# Patient Record
Sex: FEMALE | Race: BLACK OR AFRICAN AMERICAN | NOT HISPANIC OR LATINO | URBAN - METROPOLITAN AREA
[De-identification: names, ages, dates, MRNs, and addresses within clinical notes are randomized per-mention and may not be internally consistent; named-entity substitution may affect disease eponyms.]

---

## 2020-12-18 ENCOUNTER — OUTPATIENT (OUTPATIENT)
Dept: OUTPATIENT SERVICES | Facility: HOSPITAL | Age: 50
LOS: 1 days | End: 2020-12-18
Payer: MEDICAID

## 2020-12-18 DIAGNOSIS — Z20.828 CONTACT WITH AND (SUSPECTED) EXPOSURE TO OTHER VIRAL COMMUNICABLE DISEASES: ICD-10-CM

## 2020-12-18 LAB — SARS-COV-2 RNA SPEC QL NAA+PROBE: SIGNIFICANT CHANGE UP

## 2020-12-18 PROCEDURE — U0003: CPT

## 2020-12-19 DIAGNOSIS — Z20.828 CONTACT WITH AND (SUSPECTED) EXPOSURE TO OTHER VIRAL COMMUNICABLE DISEASES: ICD-10-CM

## 2022-06-29 ENCOUNTER — EMERGENCY (EMERGENCY)
Facility: HOSPITAL | Age: 52
LOS: 0 days | Discharge: HOME | End: 2022-06-30
Attending: EMERGENCY MEDICINE

## 2022-06-29 VITALS
SYSTOLIC BLOOD PRESSURE: 160 MMHG | OXYGEN SATURATION: 98 % | HEIGHT: 63 IN | DIASTOLIC BLOOD PRESSURE: 108 MMHG | TEMPERATURE: 98 F | RESPIRATION RATE: 16 BRPM | WEIGHT: 175.05 LBS | HEART RATE: 83 BPM
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
## 2022-06-29 DIAGNOSIS — R51.9 HEADACHE, UNSPECIFIED: ICD-10-CM
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
## 2022-06-29 DIAGNOSIS — Z91.013 ALLERGY TO SEAFOOD: ICD-10-CM

## 2022-06-29 DIAGNOSIS — I10 ESSENTIAL (PRIMARY) HYPERTENSION: ICD-10-CM

## 2022-06-29 PROCEDURE — 99283 EMERGENCY DEPT VISIT LOW MDM: CPT

## 2022-06-30 VITALS
DIASTOLIC BLOOD PRESSURE: 89 MMHG | HEART RATE: 74 BPM | OXYGEN SATURATION: 100 % | RESPIRATION RATE: 16 BRPM | SYSTOLIC BLOOD PRESSURE: 123 MMHG

## 2022-06-30 RX ORDER — TRAMADOL HYDROCHLORIDE 50 MG/1
1 TABLET ORAL
Qty: 15 | Refills: 0
Start: 2022-06-30 | End: 2022-07-04

## 2022-06-30 RX ORDER — DIAZEPAM 5 MG
1 TABLET ORAL
Qty: 12 | Refills: 0
Start: 2022-06-30 | End: 2022-07-03

## 2022-06-30 RX ORDER — IBUPROFEN 200 MG
1 TABLET ORAL
Qty: 15 | Refills: 0
Start: 2022-06-30 | End: 2022-07-04

## 2022-06-30 RX ORDER — IBUPROFEN 200 MG
600 TABLET ORAL ONCE
Refills: 0 | Status: COMPLETED | OUTPATIENT
Start: 2022-06-30 | End: 2022-06-30

## 2022-06-30 RX ADMIN — Medication 600 MILLIGRAM(S): at 01:58

## 2022-06-30 NOTE — ED ADULT NURSE NOTE - NSIMPLEMENTINTERV_GEN_ALL_ED
Implemented All Universal Safety Interventions:  [place] to call system. Call bell, personal items and telephone within reach. Instruct patient to call for assistance. Room bathroom lighting operational. Non-slip footwear when patient is off stretcher. Physically safe environment: no spills, clutter or unnecessary equipment. Stretcher in lowest position, wheels locked, appropriate side rails in place.
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
## 2022-06-30 NOTE — ED PROVIDER NOTE - PATIENT PORTAL LINK FT
You can access the FollowMyHealth Patient Portal offered by [facility] by registering at the following website: [email or web address]. By joining [facility]’s FollowMyHealth portal, you will also be able to view your health information using other applications (apps) compatible with our system.
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
## 2022-06-30 NOTE — ED PROVIDER NOTE - NSFOLLOWUPINSTRUCTIONS_ED_ALL_ED_FT
Take the IBUPROFEN for moderate pain.    Add TRAMADOL if the pain becomes more severe.    Take VALIUM to help with muscle relaxation.    These medications can cause drowsiness, so do not take if needing to be alert.    A warm compress may help as well to control muscle tension.

## 2023-06-24 ENCOUNTER — OFFICE (OUTPATIENT)
Dept: URBAN - METROPOLITAN AREA CLINIC 90 | Facility: CLINIC | Age: 53
Setting detail: OPHTHALMOLOGY
End: 2023-06-24
Payer: MEDICAID

## 2023-06-24 ENCOUNTER — RX ONLY (RX ONLY)
Age: 53
End: 2023-06-24

## 2023-06-24 DIAGNOSIS — H25.13: ICD-10-CM

## 2023-06-24 PROBLEM — H11.153 PINGUECULA; BOTH EYES: Status: ACTIVE | Noted: 2023-06-24

## 2023-06-24 PROBLEM — H11.133 CONJUNCTIVAL PIGMENTATIONS; BOTH EYES: Status: ACTIVE | Noted: 2023-06-24

## 2023-06-24 PROBLEM — H52.7 REFRACTIVE ERROR: Status: ACTIVE | Noted: 2023-06-24

## 2023-06-24 PROBLEM — H35.40 PERIPHERAL RETINAL DEGENERATION UNSPECIFIED: Status: ACTIVE | Noted: 2023-06-24

## 2023-06-24 PROCEDURE — 92004 COMPRE OPH EXAM NEW PT 1/>: CPT | Performed by: OPHTHALMOLOGY

## 2023-06-24 ASSESSMENT — SPHEQUIV_DERIVED
OD_SPHEQUIV: -0.125
OS_SPHEQUIV: 0.125
OD_SPHEQUIV: -0.5

## 2023-06-24 ASSESSMENT — REFRACTION_MANIFEST
OD_VA2: 20/25(J1)
OD_CYLINDER: -0.50
OS_VA1: 20/20
OD_ADD: +2.00
OS_ADD: +2.00
OS_AXIS: 080
OD_SPHERE: -0.25
OS_SPHERE: PLANO
OD_VA1: 20/20
OS_CYLINDER: -0.75
OS_VA2: 20/25(J1)
OD_AXIS: 100

## 2023-06-24 ASSESSMENT — TONOMETRY
OS_IOP_MMHG: 15
OD_IOP_MMHG: 14

## 2023-06-24 ASSESSMENT — VISUAL ACUITY
OD_BCVA: 20/25+1
OS_BCVA: 20/25

## 2023-06-24 ASSESSMENT — AXIALLENGTH_DERIVED
OD_AL: 24.216
OD_AL: 24.0631
OS_AL: 23.8678

## 2023-06-24 ASSESSMENT — KERATOMETRY
OS_K2POWER_DIOPTERS: 43.00
OD_K2POWER_DIOPTERS: 42.75
OD_AXISANGLE_DEGREES: 080
METHOD_AUTO_MANUAL: AUTO
OD_K1POWER_DIOPTERS: 42.00
OS_K1POWER_DIOPTERS: 42.25
OS_AXISANGLE_DEGREES: 102

## 2023-06-24 ASSESSMENT — REFRACTION_AUTOREFRACTION
OD_AXIS: 093
OD_CYLINDER: -0.75
OS_AXIS: 076
OS_SPHERE: +0.50
OD_SPHERE: +0.25
OS_CYLINDER: -0.75

## 2023-06-24 ASSESSMENT — CONFRONTATIONAL VISUAL FIELD TEST (CVF)
OD_FINDINGS: FULL
OS_FINDINGS: FULL

## 2024-01-16 ENCOUNTER — APPOINTMENT (OUTPATIENT)
Dept: ORTHOPEDIC SURGERY | Facility: CLINIC | Age: 54
End: 2024-01-16

## 2024-01-18 PROBLEM — Z00.00 ENCOUNTER FOR PREVENTIVE HEALTH EXAMINATION: Status: ACTIVE | Noted: 2024-01-18

## 2024-01-25 ENCOUNTER — APPOINTMENT (OUTPATIENT)
Dept: ORTHOPEDIC SURGERY | Facility: CLINIC | Age: 54
End: 2024-01-25
Payer: MEDICAID

## 2024-01-25 VITALS — HEART RATE: 90 BPM | DIASTOLIC BLOOD PRESSURE: 107 MMHG | SYSTOLIC BLOOD PRESSURE: 168 MMHG

## 2024-01-25 DIAGNOSIS — M25.512 PAIN IN LEFT SHOULDER: ICD-10-CM

## 2024-01-25 DIAGNOSIS — G89.29 PAIN IN LEFT SHOULDER: ICD-10-CM

## 2024-01-25 PROCEDURE — 99203 OFFICE O/P NEW LOW 30 MIN: CPT

## 2024-01-25 PROCEDURE — 73030 X-RAY EXAM OF SHOULDER: CPT | Mod: LT

## 2024-01-26 PROBLEM — M25.512 CHRONIC LEFT SHOULDER PAIN: Status: ACTIVE | Noted: 2024-01-26
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
## 2024-01-26 NOTE — PHYSICAL EXAM
[de-identified] : Left shoulder exam:   Inspection: No malalignment, No defects, No atrophy Skin: No masses, No lesions Neck: Negative Spurling, full ROM, no pain with ROM AROM: FF to 180, abduction to 90, ER to 80, IR to upper lumbar Painful arc ROM: none Tenderness: No bicipital tenderness, no tenderness to greater tuberosity/RTC insertion, no anterior shoulder/lesser tuberosity tenderness Strength: 5/5 ER, 5/5 IR in adduction, 5/5 supraspinatus testing, negative Brighton's test AC joint: + TTP/pain with cross arm testing Biceps: Speed Negative, Yergason Negative Impingement test: Negative Sullivan, Negative Neer Vasc: 2+ radial pulse Stability: Stable Neuro: AIN, PIN, Ulnar nerve intact to motor Sensation: Intact to light touch throughout Other findings: None. [de-identified] : The following radiographs were ordered and read by me during this patients visit. I reviewed each radiograph in detail with the patient and discussed the findings as highlighted below.  3 views of left shoulder were obtained today, 01/25/2024, that show no acute fracture or dislocation. There is no glenohumeral and mild AC joint degenerative change seen. Type II acromion. There is no significant malalignment. No significant other obvious osseous abnormality, otherwise unremarkable.

## 2024-01-26 NOTE — DISCUSSION/SUMMARY
[de-identified] : 54 y/o female with left shoulder pain.   Clinically, patient has mild positive impingement signs. A discussion was had with the patient regarding potential sources of inflammatory shoulder discomfort; including rotator cuff tendon dysfunction (including tendonitis vs. internal structural damage), subdeltoid/subacromial bursitis, or impingement of the rotator cuff at the acromion. Other contributing sources of pain may be the acromioclavicular joint or long head of the biceps tendon. Irritation is typically caused either by overhead repetitive activity or as a result of minor injury.   Discussed short-term and long-term outcomes as well as the goal of treatment to reduce pain and restore function. Nonsurgical treatment is typically first-line therapy that may take weeks to months to resolve symptoms; includes rest from overhead activities, NSAIDs, home exercise program versus physical therapy to restore normal strength/ROM/function of the shoulder, and possible corticosteroid injection. Also discussed the role of arthroscopic surgical intervention when nonsurgical treatment does not adequately relieve pain/inflammation.   Patient also has overlying cervical radicular symptoms. Referral was given for [facility] for further evaluation.   Recommendations: Conservative modalities as above (overhead activity rest/activity avoidance until less symptomatic, ice, NSAIDs, home exercise strengthening and stretching program).   Followup: If symptoms progress or persist for additional treatment as needed

## 2024-01-26 NOTE — ADDENDUM
[FreeTextEntry1] : This note was written by Aminah Allen on 01/25/2024 acting solely as a scribe for Dr. Da Espinoza.  All medical record entries made by the Scribe were at my, Dr. Da Espinoza, direction and personally dictated by me on 01/25/2024. I have personally reviewed the chart and agree that the record accurately reflects my personal performance of the history, physical exam, assessment and plan.

## 2024-02-07 ENCOUNTER — APPOINTMENT (OUTPATIENT)
Dept: ORTHOPEDIC SURGERY | Facility: CLINIC | Age: 54
End: 2024-02-07
Payer: MEDICAID

## 2024-02-07 VITALS
DIASTOLIC BLOOD PRESSURE: 101 MMHG | HEART RATE: 74 BPM | WEIGHT: 189 LBS | HEIGHT: 63 IN | SYSTOLIC BLOOD PRESSURE: 146 MMHG | BODY MASS INDEX: 33.49 KG/M2

## 2024-02-07 DIAGNOSIS — M54.2 CERVICALGIA: ICD-10-CM

## 2024-02-07 PROCEDURE — 72040 X-RAY EXAM NECK SPINE 2-3 VW: CPT

## 2024-02-07 PROCEDURE — 99214 OFFICE O/P EST MOD 30 MIN: CPT

## 2024-02-07 NOTE — PHYSICAL EXAM
[de-identified] : Examination of the cervical spine reveals no midline or paraspinal tenderness to palpation. No cervical lymphadenopathy. Decreased range of motion with respect to flexion, extension, rotation, and lateral bending.  Positive Spurlings. Negative Lhermitte's. Full range of motion bilateral shoulders without evidence of impingement. No instability of bilateral upper extremities.  Cranial nerves II through XII grossly intact. Intact sensation bilateral upper extremities. 5/5 deltoids biceps triceps wrist extensors wrist flexors finger flexors and hand intrinsics. 1+ biceps triceps and brachioradialis reflexes. Negative Contreras's. 2+ radial pulse. Negative Tinel's over the cubital and carpal tunnel. No skin lesions on the right and left upper extremities. [de-identified] : AP lateral cervical x-rays with spondylosis without instability or aggressive lesions

## 2024-02-07 NOTE — DISCUSSION/SUMMARY
[de-identified] : Given her symptoms that failed to improve with physical therapy and persistent radiculopathy we will obtain a cervical MRI.  We briefly discussed the possibility of injections.  Follow-up afterwards.

## 2024-02-07 NOTE — HISTORY OF PRESENT ILLNESS
[de-identified] : Patient is a 53 year-old female who presents to the office today for initial evaluation of neck and upper back pain. She was recently seen by my colleague, Dr. Da Espinoza, who referred her to my practice for further evaluation of her neck. Pain is mostly on the left side of the neck and radiates into the superior aspect of the left shoulder. She also gets "numbness" that radiates from the lateral shoulder down to her left thumb. These symptoms are intermittent in nature. She has been taking Naproxen which does give relief.   Upon detailed questioning of the patient, they deny any complaints of fever, chills, sweats, nausea or vomiting, bowel or bladder dysfunction, recent weight loss or gain, or night pain. The above history is in addition to the intake form, completed by the patient, that I personally reviewed and discussed with her at length during this visit. There is no correlation identified between their presenting symptoms and their family, social and past medical history.

## 2024-02-09 ENCOUNTER — NON-APPOINTMENT (OUTPATIENT)
Age: 54
End: 2024-02-09

## 2024-03-29 ENCOUNTER — APPOINTMENT (OUTPATIENT)
Dept: ORTHOPEDIC SURGERY | Facility: CLINIC | Age: 54
End: 2024-03-29
Payer: MEDICAID

## 2024-03-29 DIAGNOSIS — M48.02 SPINAL STENOSIS, CERVICAL REGION: ICD-10-CM

## 2024-03-29 DIAGNOSIS — M54.12 RADICULOPATHY, CERVICAL REGION: ICD-10-CM

## 2024-03-29 PROCEDURE — 99214 OFFICE O/P EST MOD 30 MIN: CPT

## 2024-03-29 NOTE — HISTORY OF PRESENT ILLNESS
[de-identified] : Patient returns for follow-up today.  She has had an MRI.  At this point her pain has resolved  Upon detailed questioning of the patient, they deny any complaints of fever, chills, sweats, nausea or vomiting, bowel or bladder dysfunction, recent weight loss or gain, or night pain. The above history is in addition to the intake form, completed by the patient, that I personally reviewed and discussed with her at length during this visit. There is no correlation identified between their presenting symptoms and their family, social and past medical history.

## 2024-03-29 NOTE — PHYSICAL EXAM
[de-identified] : Examination of the cervical spine reveals no midline or paraspinal tenderness to palpation. No cervical lymphadenopathy. Decreased range of motion with respect to flexion, extension, rotation, and lateral bending.  Positive Spurlings. Negative Lhermitte's. Full range of motion bilateral shoulders without evidence of impingement. No instability of bilateral upper extremities.  Cranial nerves II through XII grossly intact. Intact sensation bilateral upper extremities. 5/5 deltoids biceps triceps wrist extensors wrist flexors finger flexors and hand intrinsics. 1+ biceps triceps and brachioradialis reflexes. Negative Contreras's. 2+ radial pulse. Negative Tinel's over the cubital and carpal tunnel. No skin lesions on the right and left upper extremities. [de-identified] : AP lateral cervical x-rays with spondylosis without instability or aggressive lesions  Cervical MRI reveals multilevel neuroforaminal stenosis.  She does have increased central stenosis at C5-6 but no spinal cord compression

## 2024-03-29 NOTE — DISCUSSION/SUMMARY
[de-identified] : Symptomatology appears most consistent with left-sided C6 radiculopathy that is now resolved.  At this point she will continue with conservative measures.  We again discussed further treatment options if her symptoms return.  She will let me know of any changes or worsening of her symptoms.

## 2024-08-24 ENCOUNTER — OFFICE (OUTPATIENT)
Dept: URBAN - METROPOLITAN AREA CLINIC 90 | Facility: CLINIC | Age: 54
Setting detail: OPHTHALMOLOGY
End: 2024-08-24

## 2024-08-24 DIAGNOSIS — H25.13: ICD-10-CM

## 2024-08-24 DIAGNOSIS — H11.153: ICD-10-CM

## 2024-08-24 DIAGNOSIS — H11.133: ICD-10-CM

## 2024-08-24 DIAGNOSIS — H52.7: ICD-10-CM

## 2024-08-24 DIAGNOSIS — H35.40: ICD-10-CM

## 2024-08-24 PROCEDURE — 99212 OFFICE O/P EST SF 10 MIN: CPT | Performed by: OPHTHALMOLOGY

## 2024-08-24 ASSESSMENT — CONFRONTATIONAL VISUAL FIELD TEST (CVF)
OD_FINDINGS: FULL
OS_FINDINGS: FULL